# Patient Record
Sex: FEMALE | Race: BLACK OR AFRICAN AMERICAN | NOT HISPANIC OR LATINO | URBAN - METROPOLITAN AREA
[De-identification: names, ages, dates, MRNs, and addresses within clinical notes are randomized per-mention and may not be internally consistent; named-entity substitution may affect disease eponyms.]

---

## 2023-03-03 ENCOUNTER — OFFICE (OUTPATIENT)
Dept: URBAN - METROPOLITAN AREA CLINIC 19 | Facility: CLINIC | Age: 43
End: 2023-03-03

## 2023-03-03 VITALS
DIASTOLIC BLOOD PRESSURE: 105 MMHG | HEART RATE: 74 BPM | SYSTOLIC BLOOD PRESSURE: 199 MMHG | WEIGHT: 293 LBS | OXYGEN SATURATION: 98 % | HEIGHT: 64 IN

## 2023-03-03 DIAGNOSIS — K21.9 GASTRO-ESOPHAGEAL REFLUX DISEASE WITHOUT ESOPHAGITIS: ICD-10-CM

## 2023-03-03 DIAGNOSIS — K85.90 ACUTE PANCREATITIS WITHOUT NECROSIS OR INFECTION, UNSPECIFIE: ICD-10-CM

## 2023-03-03 DIAGNOSIS — R10.11 RIGHT UPPER QUADRANT PAIN: ICD-10-CM

## 2023-03-03 DIAGNOSIS — R19.7 DIARRHEA, UNSPECIFIED: ICD-10-CM

## 2023-03-03 PROCEDURE — 99204 OFFICE O/P NEW MOD 45 MIN: CPT

## 2023-03-03 RX ORDER — SODIUM PICOSULFATE, MAGNESIUM OXIDE, AND ANHYDROUS CITRIC ACID 10; 3.5; 12 MG/160ML; G/160ML; G/160ML
LIQUID ORAL
Qty: 320 | Refills: 0 | Status: ACTIVE
Start: 2023-03-03

## 2023-03-03 NOTE — SERVICEHPINOTES
Ms. Oliver is a 43-year-old female here who is due for a screening colonoscopy. The patient denies a personal or familial history of colorectal cancer or colon polyps. Patient does report that over the last several months she has had worsening chronic reflux. She also reports that she has had recent bowel pattern changes, predominantly diarrhea. The patient reports that she will have between 4 and 5 diarrhea stools daily. She reports that she does have urgency with the stools. She also reports that she does have right upper quadrant pain that is intermittent, waxes and wanes with intensity, and seems to correlate with fatty and/or spicy foods. The patient does have a history of pancreatitis. She was hospitalized for her pancreatitis episode which was thought to be alcohol related. She states that she has stones decreased her alcohol intake. She does report being hospitalized recently for Covid with a respiratory component. She reports she does not feel that her bowel pattern has been the same since. She denies any melena, hematochezia, or unintentional weight loss.

## 2023-03-03 NOTE — SERVICENOTES
Covid X 2- Cardiology appt next week Dr. Morfin Cardio. Had pancreatitis. Dec. 2022, hospitalized for Covid. On Nexium. Did have kidney stones. Diarrhea 5 X daily.

## 2023-03-06 LAB
AMYLASE: 72 U/L (ref 31–110)
C-REACTIVE PROTEIN, QUANT: 12 MG/L — HIGH (ref 0–10)
H. PYLORI, IGG ABS: 0.49 INDEX VALUE (ref 0–0.79)
IGG, SUBCLASSES(1-4): IGG, SUBCLASS 1: (no result) MG/DL
IGG, SUBCLASSES(1-4): IGG, SUBCLASS 2: (no result) MG/DL
IGG, SUBCLASSES(1-4): IGG, SUBCLASS 3: (no result) MG/DL
IGG, SUBCLASSES(1-4): IGG, SUBCLASS 4: (no result) MG/DL
IGG, SUBCLASSES(1-4): IMMUNOGLOBULIN G, QN, SERUM: 1079 MG/DL (ref 586–1602)
LIPASE: 33 U/L (ref 14–72)
REQUEST PROBLEM: (no result)

## 2023-03-16 LAB
FECAL FAT, QUALITATIVE: FATS, NEUTRAL: NORMAL
FECAL FAT, QUALITATIVE: FATS, TOTAL: NORMAL
GI PROFILE, STOOL, PCR: ADENOVIRUS F 40/41: NOT DETECTED
GI PROFILE, STOOL, PCR: ASTROVIRUS: NOT DETECTED
GI PROFILE, STOOL, PCR: C DIFFICILE TOXIN A/B: NOT DETECTED
GI PROFILE, STOOL, PCR: CAMPYLOBACTER: NOT DETECTED
GI PROFILE, STOOL, PCR: CRYPTOSPORIDIUM: NOT DETECTED
GI PROFILE, STOOL, PCR: CYCLOSPORA CAYETANENSIS: NOT DETECTED
GI PROFILE, STOOL, PCR: E COLI O157: (no result)
GI PROFILE, STOOL, PCR: ENTAMOEBA HISTOLYTICA: NOT DETECTED
GI PROFILE, STOOL, PCR: ENTEROAGGREGATIVE E COLI: NOT DETECTED
GI PROFILE, STOOL, PCR: ENTEROPATHOGENIC E COLI: NOT DETECTED
GI PROFILE, STOOL, PCR: ENTEROTOXIGENIC E COLI: NOT DETECTED
GI PROFILE, STOOL, PCR: GIARDIA LAMBLIA: NOT DETECTED
GI PROFILE, STOOL, PCR: NOROVIRUS GI/GII: NOT DETECTED
GI PROFILE, STOOL, PCR: PLESIOMONAS SHIGELLOIDES: NOT DETECTED
GI PROFILE, STOOL, PCR: ROTAVIRUS A: NOT DETECTED
GI PROFILE, STOOL, PCR: SALMONELLA: NOT DETECTED
GI PROFILE, STOOL, PCR: SAPOVIRUS: NOT DETECTED
GI PROFILE, STOOL, PCR: SHIGA-TOXIN-PRODUCING E COLI: NOT DETECTED
GI PROFILE, STOOL, PCR: SHIGELLA/ENTEROINVASIVE E COLI: NOT DETECTED
GI PROFILE, STOOL, PCR: VIBRIO CHOLERAE: NOT DETECTED
GI PROFILE, STOOL, PCR: VIBRIO: NOT DETECTED
GI PROFILE, STOOL, PCR: YERSINIA ENTEROCOLITICA: NOT DETECTED
PANCREATIC ELASTASE, FECAL: <50 UG ELAST./G — LOW